# Patient Record
Sex: FEMALE | Race: AMERICAN INDIAN OR ALASKA NATIVE
[De-identification: names, ages, dates, MRNs, and addresses within clinical notes are randomized per-mention and may not be internally consistent; named-entity substitution may affect disease eponyms.]

---

## 2018-09-25 ENCOUNTER — HOSPITAL ENCOUNTER (OUTPATIENT)
Dept: HOSPITAL 5 - GIO | Age: 38
Discharge: HOME | End: 2018-09-25
Attending: SPECIALIST
Payer: MEDICAID

## 2018-09-25 VITALS — SYSTOLIC BLOOD PRESSURE: 132 MMHG | DIASTOLIC BLOOD PRESSURE: 73 MMHG

## 2018-09-25 DIAGNOSIS — Z98.890: ICD-10-CM

## 2018-09-25 DIAGNOSIS — K21.0: Primary | ICD-10-CM

## 2018-09-25 DIAGNOSIS — I10: ICD-10-CM

## 2018-09-25 DIAGNOSIS — Z88.8: ICD-10-CM

## 2018-09-25 DIAGNOSIS — Z87.891: ICD-10-CM

## 2018-09-25 DIAGNOSIS — K44.9: ICD-10-CM

## 2018-09-25 DIAGNOSIS — F41.9: ICD-10-CM

## 2018-09-25 DIAGNOSIS — Z72.89: ICD-10-CM

## 2018-09-25 DIAGNOSIS — G47.30: ICD-10-CM

## 2018-09-25 DIAGNOSIS — Z86.2: ICD-10-CM

## 2018-09-25 DIAGNOSIS — E66.9: ICD-10-CM

## 2018-09-25 PROCEDURE — 81025 URINE PREGNANCY TEST: CPT

## 2018-09-25 PROCEDURE — 43235 EGD DIAGNOSTIC BRUSH WASH: CPT

## 2018-09-25 PROCEDURE — 82962 GLUCOSE BLOOD TEST: CPT

## 2018-09-25 NOTE — ANESTHESIA CONSULTATION
Anesthesia Consult and Med Hx


Date of service: 09/25/18





- Airway


Anesthetic Teeth Evaluation: Good


ROM Head & Neck: Adequate


Mental/Hyoid Distance: Adequate


Mallampati Class: Class II


Intubation Access Assessment: Probably Good





- Pulmonary Exam


CTA: Yes





- Cardiac Exam


Cardiac Exam: RRR





- Pre-Operative Health Status


ASA Pre-Surgery Classification: ASA3


Proposed Anesthetic Plan: MAC





- Pulmonary


Hx Smoking: Yes (QUIT 2010)


Hx Sleep Apnea: Yes (NO CPAP, "RESOLVED")





- Cardiovascular System


Hx Hypertension: Yes ("AFTER WEIGHT LOSS,  TOOK ME OFF MEDS OVER 1 YR AGO")


Hx Peripheral Vascular Disease: No (leg swelling )





- Central Nervous System


Hx Psychiatric Problems: Yes (anxiety )





- Gastrointestinal


Hx Gastroesophageal Reflux Disease: Yes





- Endocrine


Hx Non-Insulin Dependent Diabetes: No (Borderline, no meds. BS 83)





- Hematic


Hx Anemia: Yes





- Other Systems


Hx Alcohol Use: Yes (SOCIAL)


Hx Obesity: Yes

## 2019-01-14 ENCOUNTER — HOSPITAL ENCOUNTER (OUTPATIENT)
Dept: HOSPITAL 5 - SLR | Age: 39
Discharge: HOME | End: 2019-01-14
Attending: OTOLARYNGOLOGY
Payer: MEDICAID

## 2019-01-14 DIAGNOSIS — R40.0: ICD-10-CM

## 2019-01-14 DIAGNOSIS — R06.83: ICD-10-CM

## 2019-01-14 DIAGNOSIS — G47.33: Primary | ICD-10-CM

## 2019-01-14 DIAGNOSIS — K21.9: ICD-10-CM

## 2019-01-14 DIAGNOSIS — E11.9: ICD-10-CM

## 2019-01-14 DIAGNOSIS — Z87.891: ICD-10-CM

## 2019-01-14 DIAGNOSIS — I10: ICD-10-CM

## 2019-01-14 DIAGNOSIS — E66.9: ICD-10-CM

## 2019-01-14 PROCEDURE — G0399 HOME SLEEP TEST/TYPE 3 PORTA: HCPCS
